# Patient Record
Sex: MALE | Race: WHITE
[De-identification: names, ages, dates, MRNs, and addresses within clinical notes are randomized per-mention and may not be internally consistent; named-entity substitution may affect disease eponyms.]

---

## 2017-12-08 ENCOUNTER — HOSPITAL ENCOUNTER (EMERGENCY)
Dept: HOSPITAL 80 - FED | Age: 82
Discharge: HOME | End: 2017-12-08
Payer: COMMERCIAL

## 2017-12-08 VITALS
OXYGEN SATURATION: 93 % | HEART RATE: 84 BPM | DIASTOLIC BLOOD PRESSURE: 82 MMHG | SYSTOLIC BLOOD PRESSURE: 154 MMHG | RESPIRATION RATE: 16 BRPM

## 2017-12-08 VITALS — TEMPERATURE: 97.9 F

## 2017-12-08 DIAGNOSIS — Z79.01: ICD-10-CM
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
## 2017-12-08 DIAGNOSIS — I10: ICD-10-CM
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
## 2017-12-08 DIAGNOSIS — S22.080A: Primary | ICD-10-CM

## 2017-12-08 LAB
% IMMATURE GRANULYOCYTES: 0.2 % (ref 0–1.1)
ABSOLUTE IMMATURE GRANULOCYTES: 0.02 10^3/UL (ref 0–0.1)
ABSOLUTE NRBC COUNT: 0 10^3/UL (ref 0–0.01)
ADD DIFF?: NO
ADD MORPH?: NO
ADD SCAN?: NO
ANION GAP SERPL CALC-SCNC: 15 MEQ/L (ref 8–16)
APTT BLD: 28.3 SEC (ref 23–38)
ATYPICAL LYMPHOCYTE FLAG: 0 (ref 0–99)
CALCIUM SERPL-MCNC: 10.2 MG/DL (ref 8.5–10.4)
CHLORIDE SERPL-SCNC: 105 MEQ/L (ref 97–110)
CO2 SERPL-SCNC: 22 MEQ/L (ref 22–31)
CREAT SERPL-MCNC: 1.8 MG/DL (ref 0.7–1.3)
ERYTHROCYTE [DISTWIDTH] IN BLOOD BY AUTOMATED COUNT: 13.2 % (ref 11.5–15.2)
FRAGMENT RBC FLAG: 0 (ref 0–99)
GFR SERPL CREATININE-BSD FRML MDRD: 36 ML/MIN/{1.73_M2}
GLUCOSE SERPL-MCNC: 105 MG/DL (ref 70–100)
HCT VFR BLD CALC: 43.6 % (ref 40–51)
HGB BLD-MCNC: 15.4 G/DL (ref 13.7–17.5)
INR PPP: 1.22 (ref 0.83–1.16)
LEFT SHIFT FLG: 0 (ref 0–99)
LIPEMIA HEMOLYSIS FLAG: 90 (ref 0–99)
MCH RBC BLDCO QN: 33.2 PG (ref 27.9–34.1)
MCHC RBC AUTO-ENTMCNC: 35.3 G/DL (ref 32.4–36.7)
MCV RBC AUTO: 94 FL (ref 81.5–99.8)
NRBC-AUTO%: 0 % (ref 0–0.2)
PLATELET # BLD: 123 10^3/UL (ref 150–400)
PLATELET CLUMPS FLAG: 10 (ref 0–99)
PMV BLD AUTO: 9.1 FL (ref 8.7–11.7)
POTASSIUM SERPL-SCNC: 4.5 MEQ/L (ref 3.5–5.2)
PROTHROMBIN TIME: 15.6 SEC (ref 12–15)
RBC # BLD AUTO: 4.64 10^6/UL (ref 4.4–6.38)
SODIUM SERPL-SCNC: 142 MEQ/L (ref 134–144)

## 2017-12-08 NOTE — EDPHY
General


Narrative: 





CHIEF COMPLAINT: 


Fall, back pain





HISTORY OF PRESENT ILLNESS: 


Patient presents with son at bedside.  He complains of fall down the stairs 

yesterday.  He was walking up stairs from his basement when he slipped and 

fell.  He fell backwards, landing on his low back and striking his head.  He 

thinks that he slid down approximately 5-7 stairs.  He did not lose 

consciousness.  He has no headache or neck pain.  No changes in vision.  No 

nausea or vomiting. No chest pain or shortness of breath.  No abdominal pain.  

His only complaint is back pain. This is located in the right lower lumbar soft 

tissue just over the posterior superior iliac spine.  He has no midline 

tenderness at any level.  No saddle anesthesia.  No weakness of the lower 

extremities.  He is able to ambulate but twisting and transferring from 1 spot 

to another is moderately painful.  He has no other associated complaints or 

modifying factors.  He does take Coumadin.





REVIEW OF SYSTEMS:


Ten systems reviewed and are negative unless otherwise noted in the HPI





PCP:


Dr. Alka Garcia





SPECIALISTS:


None





PAST MEDICAL HISTORY: 


Dyslipidemia, atrial fibrillation, hypertension, taking Coumadin





PAST SURGICAL HISTORY:


None recently





SOCIAL HISTORY:


Nonsmoker.  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





FAMILY HISTORY:


Noncontributory





EXAMINATION


General Appearance:  Alert, no distress


Head: normocephalic, atraumatic.  No depression.  No hematoma.  No laceration.  

No John sign. No raccoon eyes.


Eyes:  Bilateral arcus senilis.  Pupils equal and round, no conjunctival pallor 

or injection.  EOMs intact.


ENT, Mouth:  Mucous membranes moist.  Airway is patent


Neck:  Supple nontender. No crepitus, step-off or deformity.  Mild reversal of 

lordosis.


Respiratory:  Lungs are clear to auscultation.  No wheezing, rhonchi or crackles


Cardiovascular:  Regular rate and rhythm.  Grade 1 systolic murmur.


Gastrointestinal:  Abdomen is soft and nontender.  No tympany.  No rigidity.  

No CVA tenderness.


Back:  No midline tenderness. No crepitus, step-off or deformity.  There is 

tenderness of the soft tissue of the right lumbar region just over the 

posterior superior iliac spine.


Neurological:  GCS 15.  Cranial nerves 2-12 grossly intact.  A&O, nonfocal, 

antalgic but steady gait.  Strength is symmetric in all 4 limbs.  Symmetric 

patellar reflexes 2+.


Skin:  Warm and dry, no rash.  Multiple H spots.  No lacerations or contusions.


Extremities:  Nontender, no pedal edema.  Symmetric range of motion.


Psychiatric:  Mood and affect normal





DIFFERENTIAL DIAGNOSES:


Including but not limited to sprain, strain, fracture, intracranial hemorrhage, 

cervical fracture, contusion, hematoma, renal laceration








MDM:


10:23 a.m.


Mechanical fall yesterday with right-sided soft tissue back pain over the 

lumbar region.  He has no midline tenderness at any level of the spine.  No 

headache.  He does take Coumadin, thus I have ordered CT scans of the head, 

cervical spine and abdomen pelvis.  He is in no acute distress. Normal neuro 

examination.





11:30 a.m.


Case discussed with radiologist Dr. Oppenheimer.  CT of the head negative for 

any acute findings.  CT cervical spine reveals a fracture at T1 of uncertain 

chronicity.  Recommends lateral flexion extension to rule out unstable 

fracture.  There is an acute T12 fracture with retropulsion but the posterior 

elements are intact. I will consult Neurosurgery due to the T12 fracture.  The 

T1 fractures likely chronic as he has no pain there, but I will obtain the 

flexion extension imaging.





11:35 a.m.


Case discussed with neurosurgeon Dr. Martinez.  He will review the CT scan return 

my call.  His recommendation is a [place] brace and follow up in the clinic.





11:40 a.m.


I re-evaluated the patient.  He confirmed that there is no tenderness palpation 

in the region of the T1 fracture.  This most likely is an old injury. He does 

have some occasional pain in that area when he writes for prolonged.  Looking 

at the computer screen.  Confirm that there are no radicular symptoms of the 

arms.  He also has no saddle anesthesia, incontinence, weakness of lower 

extremities. I have ordered the brace to be placed.  I have also ordered the 

flex and extension x-ray.  I have also ordered laboratory studies as he is on 

Coumadin.  He is in no acute distress.  He is neuro intact at this time.





12:45 p.m.


Flexion-extension x-ray is negative for any unstable fracture





1:30 p.m.


Patient re-evaluated.  He has the [place] brace in place.  He has ambulated 

multiple times without difficulty.  I did offer admission the hospital for re-

evaluation and consideration of kyphoplasty after reversal of Coumadin, but he 

declines.  He would like to go home.  I do feel he is stable for discharge 

home.  I would like him to return to the ER should she have worsening pain, any 

saddle anesthesia, weakness of the lower extremities, incontinence of bowel or 

bladder.  He is comfortable this plan and asking to be discharged home.





SUPERVISION:


Patient was independently examined, but I discussed the case with my secondary 

supervising physician Dr. Mccollester








- Diagnostics


Imaging Results: 


 Imaging Impressions





Abdomen/Pelvis CT  12/08/17 10:22


Impression: No acute posttraumatic abnormality identified. The patient has had 

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


 


2. CT Cervical Spine Without Contrast at 10:41 AM


 


History: Trauma. Fall yesterday. Pain. on Coumadin.


 


Technique: Multislice helical CT through the cervical spine without contrast 

from the skull base to T1. Soft tissue and bone evaluation is performed. 

Sagittal and coronal reconstructions are obtained and reviewed. Dose reduction 

techniques were utilized.


 


Findings: There is a mild superior endplate compression of T1, that is of 

unknown age. Cervical alignment is anatomic, but straight. There are 

degenerative retrolisthesis cc between C5 and C7 and a mild degenerative 

spondylolisthesis at C7-T1. There is degenerative disk space narrowing at C3-C4 

and between C5 and C7. No subluxation or dislocation is identified. The 

relationship between skull base and C1 is normal. The C1-C2 articulation is 

normally aligned, but severely osteoarthritic. The odontoid process is intact.  

Facet joints are normally aligned, but significantly degenerated on the right 

it see 2-C3 and between C6 and T1 and on the left between C3 and C3 and C5 and 

at C7-T1. The left C2-C3 facet joint and disk space are solidly fused..  Soft 

tissue window evaluation does not show evidence of epidural or prevertebral 

hematoma. Large posterior osteophytes at C6-C7 cause moderate encroachment on 

the ventral thecal sac, right greater than left. Incidentally noted is 

bilateral carotid bifurcation atherosclerotic calcification and severe left TMJ 

arthritic change.


 


Impression: 1. Mild T1 compression of unknown age, associated with mild 

spondylolisthesis at C7-T1. The patient is not a candidate for MRI since he has 

a pacemaker. 


2. Multilevel degenerative disk and facet change described above.


3. No acute cervical fracture identified.


4. If there is concern for instability consider lateral flexion-extension views.


 


3. CT Abdomen and Pelvis Without Contrast ([facility] Protocol), 10:50 AM


 


History: Fall yesterday, right back pain and buttock pain, patient on Coumadin 

and  history of left nephrectomy


 


Technique: Ultrathin ultrafast 128 slice helical CT through the abdomen and 

pelvis without contrast. Dose reduction techniques were utilized.


 


Findings: There is an acute appearing mild compression abnormality of T12 

involving both the superior and inferior endplates. There is mild, 3-4 mm, 

retropulsion of the posterior aspect of T12 into the ventral aspect of the 

neural canal. A dominant fracture line courses obliquely through the anterior 

inferior portion of the vertebral body. Interestingly, there are 2 small dural 

or extradural calcifications in the anterior right epidural space behind the 

lower T12 level, that worse causes a mild right anterior ventral extradural 

defect on the thecal sac and causes mild encroachment on the ventral aspect of 

the medial right T12-L1 foramen. No other spinal fracture is identified. There 

is degenerative lumbar disk disease between L2 and L3 and between L4 and S1. 

Lumbar alignment is anatomic. The T12 pedicles are not widened and the 

posterior elements are intact. The facet joints remain normally aligned. There 

is no significant paraspinal hematoma. Is a small amount of edema in the fat 

adjacent to the left side of T12.


 


The lung bases are normally aerated without pulmonary contusion, pleural fluid 

or basilar pneumothorax. There is a small lung cyst at the posterior right lung 

base as well as a cluster of calcified granulomas. Pacer wires overlie the 

normal sized heart. There is no pericardial effusion. There is calcification of 

the aortic valve leaflets and evidence of coronary artery atherosclerotic 

disease.


 


There is no evidence of hemorrhage in the peritoneal cavity or retroperitoneum. 

The patient has a normal-appearing right hip replacement. There is 

atherosclerotic calcification of a normal sized abdominal aorta. The right 

kidney is associated with a 19 mm lower pole low density lesion that has 

average Hounsfield units of 2 and likely represents an incidental cyst. There 

is no mass or nodularity in the left renal bed or associated retroperitoneal 

adenopathy. There are calcified granulomas in the spleen and liver. There is no 

free fluid or evidence for bowel obstruction. No pelvic fracture is identified 

the left hip and pelvic ring appear intact. The SI joints are normally located. 

No lumbar fracture is identified.


 


Impression: 1. Acute mild T12 compression fracture.


2. No intra-abdominal or soft tissue hematoma identified.


 


Results of all studies called and discussed with Cra Be, at 12/8/2017 11:

26


 


Attention: This CT examination is specifically designed to evaluate patients 

who are clinically suspected of having acute obstructive uropathy.  This 

examination does not use radiographic contrast, and as such, provides only a  

limited evaluation of the abdomen, pelvis and retroperitoneum.  If there is 

further clinical suspicion for pathological conditions other than obstructive 

uropathy, a complete CT evaluation of the abdomen and pelvis utilizing 

intravenous, oral, and rectal contrast should be considered.


 


General information for patients regarding this examination can be found at 

[email or web address].


 


If you have questions or comments about this report, please contact me at [phone] (hospital) or [phone] (cell). 


 








Cervical Spine CT  12/08/17 10:22


Impression: No acute posttraumatic abnormality identified. The patient has had 

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


 


2. CT Cervical Spine Without Contrast at 10:41 AM


 


History: Trauma. Fall yesterday. Pain. on Coumadin.


 


Technique: Multislice helical CT through the cervical spine without contrast 

from the skull base to T1. Soft tissue and bone evaluation is performed. 

Sagittal and coronal reconstructions are obtained and reviewed. Dose reduction 

techniques were utilized.


 


Findings: There is a mild superior endplate compression of T1, that is of 

unknown age. Cervical alignment is anatomic, but straight. There are 

degenerative retrolisthesis cc between C5 and C7 and a mild degenerative 

spondylolisthesis at C7-T1. There is degenerative disk space narrowing at C3-C4 

and between C5 and C7. No subluxation or dislocation is identified. The 

relationship between skull base and C1 is normal. The C1-C2 articulation is 

normally aligned, but severely osteoarthritic. The odontoid process is intact.  

Facet joints are normally aligned, but significantly degenerated on the right 

it see 2-C3 and between C6 and T1 and on the left between C3 and C3 and C5 and 

at C7-T1. The left C2-C3 facet joint and disk space are solidly fused..  Soft 

tissue window evaluation does not show evidence of epidural or prevertebral 

hematoma. Large posterior osteophytes at C6-C7 cause moderate encroachment on 

the ventral thecal sac, right greater than left. Incidentally noted is 

bilateral carotid bifurcation atherosclerotic calcification and severe left TMJ 

arthritic change.


 


Impression: 1. Mild T1 compression of unknown age, associated with mild 

spondylolisthesis at C7-T1. The patient is not a candidate for MRI since he has 

a pacemaker. 


2. Multilevel degenerative disk and facet change described above.


3. No acute cervical fracture identified.


4. If there is concern for instability consider lateral flexion-extension views.


 


3. CT Abdomen and Pelvis Without Contrast ([facility] Protocol), 10:50 AM


 


History: Fall yesterday, right back pain and buttock pain, patient on Coumadin 

and  history of left nephrectomy


 


Technique: Ultrathin ultrafast 128 slice helical CT through the abdomen and 

pelvis without contrast. Dose reduction techniques were utilized.


 


Findings: There is an acute appearing mild compression abnormality of T12 

involving both the superior and inferior endplates. There is mild, 3-4 mm, 

retropulsion of the posterior aspect of T12 into the ventral aspect of the 

neural canal. A dominant fracture line courses obliquely through the anterior 

inferior portion of the vertebral body. Interestingly, there are 2 small dural 

or extradural calcifications in the anterior right epidural space behind the 

lower T12 level, that worse causes a mild right anterior ventral extradural 

defect on the thecal sac and causes mild encroachment on the ventral aspect of 

the medial right T12-L1 foramen. No other spinal fracture is identified. There 

is degenerative lumbar disk disease between L2 and L3 and between L4 and S1. 

Lumbar alignment is anatomic. The T12 pedicles are not widened and the 

posterior elements are intact. The facet joints remain normally aligned. There 

is no significant paraspinal hematoma. Is a small amount of edema in the fat 

adjacent to the left side of T12.


 


The lung bases are normally aerated without pulmonary contusion, pleural fluid 

or basilar pneumothorax. There is a small lung cyst at the posterior right lung 

base as well as a cluster of calcified granulomas. Pacer wires overlie the 

normal sized heart. There is no pericardial effusion. There is calcification of 

the aortic valve leaflets and evidence of coronary artery atherosclerotic 

disease.


 


There is no evidence of hemorrhage in the peritoneal cavity or retroperitoneum. 

The patient has a normal-appearing right hip replacement. There is 

atherosclerotic calcification of a normal sized abdominal aorta. The right 

kidney is associated with a 19 mm lower pole low density lesion that has 

average Hounsfield units of 2 and likely represents an incidental cyst. There 

is no mass or nodularity in the left renal bed or associated retroperitoneal 

adenopathy. There are calcified granulomas in the spleen and liver. There is no 

free fluid or evidence for bowel obstruction. No pelvic fracture is identified 

the left hip and pelvic ring appear intact. The SI joints are normally located. 

No lumbar fracture is identified.


 


Impression: 1. Acute mild T12 compression fracture.


2. No intra-abdominal or soft tissue hematoma identified.


 


Results of all studies called and discussed with Car Be, at 12/8/2017 11:

26


 


Attention: This CT examination is specifically designed to evaluate patients 

who are clinically suspected of having acute obstructive uropathy.  This 

examination does not use radiographic contrast, and as such, provides only a  

limited evaluation of the abdomen, pelvis and retroperitoneum.  If there is 

further clinical suspicion for pathological conditions other than obstructive 

uropathy, a complete CT evaluation of the abdomen and pelvis utilizing 

intravenous, oral, and rectal contrast should be considered.


 


General information for patients regarding this examination can be found at 

[email or web address].


 


If you have questions or comments about this report, please contact me at [phone] (hospital) or [phone] (cell). 


 








Head CT  12/08/17 10:22


Impression: No acute posttraumatic abnormality identified. The patient has had 

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


 


2. CT Cervical Spine Without Contrast at 10:41 AM


 


History: Trauma. Fall yesterday. Pain. on Coumadin.


 


Technique: Multislice helical CT through the cervical spine without contrast 

from the skull base to T1. Soft tissue and bone evaluation is performed. 

Sagittal and coronal reconstructions are obtained and reviewed. Dose reduction 

techniques were utilized.


 


Findings: There is a mild superior endplate compression of T1, that is of 

unknown age. Cervical alignment is anatomic, but straight. There are 

degenerative retrolisthesis cc between C5 and C7 and a mild degenerative 

spondylolisthesis at C7-T1. There is degenerative disk space narrowing at C3-C4 

and between C5 and C7. No subluxation or dislocation is identified. The 

relationship between skull base and C1 is normal. The C1-C2 articulation is 

normally aligned, but severely osteoarthritic. The odontoid process is intact.  

Facet joints are normally aligned, but significantly degenerated on the right 

it see 2-C3 and between C6 and T1 and on the left between C3 and C3 and C5 and 

at C7-T1. The left C2-C3 facet joint and disk space are solidly fused..  Soft 

tissue window evaluation does not show evidence of epidural or prevertebral 

hematoma. Large posterior osteophytes at C6-C7 cause moderate encroachment on 

the ventral thecal sac, right greater than left. Incidentally noted is 

bilateral carotid bifurcation atherosclerotic calcification and severe left TMJ 

arthritic change.


 


Impression: 1. Mild T1 compression of unknown age, associated with mild 

spondylolisthesis at C7-T1. The patient is not a candidate for MRI since he has 

a pacemaker. 


2. Multilevel degenerative disk and facet change described above.


3. No acute cervical fracture identified.


4. If there is concern for instability consider lateral flexion-extension views.


 


3. CT Abdomen and Pelvis Without Contrast ([facility]), 10:50 AM


 


History: Fall yesterday, right back pain and buttock pain, patient on Coumadin 

and  history of left nephrectomy


 


Technique: Ultrathin ultrafast 128 slice helical CT through the abdomen and 

pelvis without contrast. Dose reduction techniques were utilized.


 


Findings: There is an acute appearing mild compression abnormality of T12 

involving both the superior and inferior endplates. There is mild, 3-4 mm, 

retropulsion of the posterior aspect of T12 into the ventral aspect of the 

neural canal. A dominant fracture line courses obliquely through the anterior 

inferior portion of the vertebral body. Interestingly, there are 2 small dural 

or extradural calcifications in the anterior right epidural space behind the 

lower T12 level, that worse causes a mild right anterior ventral extradural 

defect on the thecal sac and causes mild encroachment on the ventral aspect of 

the medial right T12-L1 foramen. No other spinal fracture is identified. There 

is degenerative lumbar disk disease between L2 and L3 and between L4 and S1. 

Lumbar alignment is anatomic. The T12 pedicles are not widened and the 

posterior elements are intact. The facet joints remain normally aligned. There 

is no significant paraspinal hematoma. Is a small amount of edema in the fat 

adjacent to the left side of T12.


 


The lung bases are normally aerated without pulmonary contusion, pleural fluid 

or basilar pneumothorax. There is a small lung cyst at the posterior right lung 

base as well as a cluster of calcified granulomas. Pacer wires overlie the 

normal sized heart. There is no pericardial effusion. There is calcification of 

the aortic valve leaflets and evidence of coronary artery atherosclerotic 

disease.


 


There is no evidence of hemorrhage in the peritoneal cavity or retroperitoneum. 

The patient has a normal-appearing right hip replacement. There is 

atherosclerotic calcification of a normal sized abdominal aorta. The right 

kidney is associated with a 19 mm lower pole low density lesion that has 

average Hounsfield units of 2 and likely represents an incidental cyst. There 

is no mass or nodularity in the left renal bed or associated retroperitoneal 

adenopathy. There are calcified granulomas in the spleen and liver. There is no 

free fluid or evidence for bowel obstruction. No pelvic fracture is identified 

the left hip and pelvic ring appear intact. The SI joints are normally located. 

No lumbar fracture is identified.


 


Impression: 1. Acute mild T12 compression fracture.


2. No intra-abdominal or soft tissue hematoma identified.


 


Results of all studies called and discussed with Car Be, at 12/8/2017 11:

26


 


Attention: This CT examination is specifically designed to evaluate patients 

who are clinically suspected of having acute obstructive uropathy.  This 

examination does not use radiographic contrast, and as such, provides only a  

limited evaluation of the abdomen, pelvis and retroperitoneum.  If there is 

further clinical suspicion for pathological conditions other than obstructive 

uropathy, a complete CT evaluation of the abdomen and pelvis utilizing 

intravenous, oral, and rectal contrast should be considered.


 


General information for patients regarding this examination can be found at 

[email or web address]


 


If you have questions or comments about this report, please contact me at [phone] (hospital) or [phone] (cell). 


 








Cervical Spine X-Ray  12/08/17 11:28


Impression: No instability identified in this patient with limited range of 

motion


 














- History


Smoking Status: Never smoked





- Objective


Vital Signs: 


 Initial Vital Signs











Temperature (C)  97.9 F   12/08/17 09:41


 


Heart Rate  60   12/08/17 09:41


 


Respiratory Rate  17   12/08/17 09:41


 


Blood Pressure  118/72   12/08/17 09:41


 


O2 Sat (%)  91 L  12/08/17 09:41








 











O2 Delivery Mode               Room Air














Allergies/Adverse Reactions: 


 





No Allergies [NKDA] Allergy (Verified 07/17/14 10:06)


 


SEASONAL Allergy (Intermediate, Uncoded 09/03/11 10:24)


 ITCHY EYES, SNEEZY








Home Medications: 














 Medication  Instructions  Recorded


 


Cholecalciferol (Vitamin D3) 1,000 unit PO BID 09/03/11





[Vitamin D3]  


 


Metoprolol Succinate Xr [Toprol Xl 50 mg PO BID 09/03/11





50 mg (*)]  


 


Warfarin Sodium [Coumadin 5MG (*)] 2.5 mg PO SUTUTHSA 09/03/11


 


Warfarin Sodium [Coumadin 5MG (*)] 5 mg PO MOWEFR 09/03/11


 


Acetaminophen [Pain Relief] 650 mg PO DAILY PRN 01/27/12


 


Atorvastatin Calcium [Lipitor 80 80 mg PO HS 07/17/14





mg]  


 


Hydrocodone/APAP 5/325 [Norco 1 - 2 tab PO Q4H PRN #15 tab 12/08/17





5/325 (*)]  











Laboratory Results: 


 Laboratory Results





 12/08/17 12:07 





 12/08/17 12:07 





 











  12/08/17 12/08/17 12/08/17





  12:07 12:07 12:07


 


WBC      8.57 10^3/uL 10^3/uL





     (3.80-9.50) 


 


RBC      4.64 10^6/uL 10^6/uL





     (4.40-6.38) 


 


Hgb      15.4 g/dL g/dL





     (13.7-17.5) 


 


Hct      43.6 % %





     (40.0-51.0) 


 


MCV      94.0 fL fL





     (81.5-99.8) 


 


MCH      33.2 pg pg





     (27.9-34.1) 


 


MCHC      35.3 g/dL g/dL





     (32.4-36.7) 


 


RDW      13.2 % %





     (11.5-15.2) 


 


Plt Count      123 10^3/uL L 10^3/uL





     (150-400) 


 


MPV      9.1 fL fL





     (8.7-11.7) 


 


Neut % (Auto)      81.0 % H %





     (39.3-74.2) 


 


Lymph % (Auto)      9.9 % L %





     (15.0-45.0) 


 


Mono % (Auto)      7.5 % %





     (4.5-13.0) 


 


Eos % (Auto)      1.2 % %





     (0.6-7.6) 


 


Baso % (Auto)      0.2 % L %





     (0.3-1.7) 


 


Nucleat RBC Rel Count      0.0 % %





     (0.0-0.2) 


 


Absolute Neuts (auto)      6.94 10^3/uL H 10^3/uL





     (1.70-6.50) 


 


Absolute Lymphs (auto)      0.85 10^3/uL L 10^3/uL





     (1.00-3.00) 


 


Absolute Monos (auto)      0.64 10^3/uL 10^3/uL





     (0.30-0.80) 


 


Absolute Eos (auto)      0.10 10^3/uL 10^3/uL





     (0.03-0.40) 


 


Absolute Basos (auto)      0.02 10^3/uL 10^3/uL





     (0.02-0.10) 


 


Absolute Nucleated RBC      0.00 10^3/uL 10^3/uL





     (0-0.01) 


 


Immature Gran %      0.2 % %





     (0.0-1.1) 


 


Immature Gran #      0.02 10^3/uL 10^3/uL





     (0.00-0.10) 


 


PT    15.6 SEC H SEC  





    (12.0-15.0)  


 


INR    1.22  H   





    (0.83-1.16)  


 


APTT    28.3 SEC SEC  





    (23.0-38.0)  


 


Sodium  142 mEq/L mEq/L    





   (134-144)   


 


Potassium  4.5 mEq/L mEq/L    





   (3.5-5.2)   


 


Chloride  105 mEq/L mEq/L    





   ()   


 


Carbon Dioxide  22 mEq/l mEq/l    





   (22-31)   


 


Anion Gap  15 mEq/L mEq/L    





   (8-16)   


 


BUN  37 mg/dL H mg/dL    





   (7-23)   


 


Creatinine  1.8 mg/dL H mg/dL    





   (0.7-1.3)   


 


Estimated GFR  36     





    


 


Glucose  105 mg/dL H mg/dL    





   ()   


 


Calcium  10.2 mg/dL mg/dL    





   (8.5-10.4)   














Departure





- Departure


Disposition: Home, Routine, Self-Care


Clinical Impression: 


 T12 compression fracture





Condition: Good


Instructions:  Vertebral Compression Fracture (ED)


Additional Instructions: 


1.  Wear your brace as instructed


2.  Contact her primary care physician


3.  Contact the on-call neurosurgeon as provided


4.  ED precautions for worsening pain, difficulty ambulating, numbness, 

tingling or weakness


Referrals: 


Alka Pickett MD [Primary Care Provider] - As per Instructions


Brenton Martinez MD [Medical Doctor] - As per Instructions


Prescriptions: 


Hydrocodone/APAP 5/325 [Norco 5/325 (*)] 1 - 2 tab PO Q4H PRN #15 tab


 PRN Reason: Pain, Moderate

## 2017-12-26 ENCOUNTER — HOSPITAL ENCOUNTER (OUTPATIENT)
Dept: HOSPITAL 80 - FIMAGING | Age: 82
Discharge: HOME | End: 2017-12-26
Attending: INTERNAL MEDICINE
Payer: COMMERCIAL

## 2017-12-26 VITALS
TEMPERATURE: 97.7 F | RESPIRATION RATE: 16 BRPM | OXYGEN SATURATION: 93 % | SYSTOLIC BLOOD PRESSURE: 120 MMHG | DIASTOLIC BLOOD PRESSURE: 66 MMHG

## 2017-12-26 VITALS — HEART RATE: 61 BPM

## 2017-12-26 DIAGNOSIS — S22.080A: Primary | ICD-10-CM

## 2017-12-26 DIAGNOSIS — I10: ICD-10-CM

## 2017-12-26 DIAGNOSIS — Z96.652: ICD-10-CM

## 2017-12-26 DIAGNOSIS — Z96.641: ICD-10-CM
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
## 2017-12-26 DIAGNOSIS — I48.91: ICD-10-CM

## 2017-12-26 DIAGNOSIS — Z95.0: ICD-10-CM

## 2017-12-26 DIAGNOSIS — Z79.01: ICD-10-CM

## 2017-12-26 DIAGNOSIS — Z90.5: ICD-10-CM

## 2017-12-26 DIAGNOSIS — Z85.528: ICD-10-CM

## 2017-12-26 DIAGNOSIS — M54.9: ICD-10-CM

## 2017-12-26 DIAGNOSIS — G47.30: ICD-10-CM

## 2017-12-26 LAB
% IMMATURE GRANULYOCYTES: 0.3 % (ref 0–1.1)
ABSOLUTE IMMATURE GRANULOCYTES: 0.02 10^3/UL (ref 0–0.1)
ABSOLUTE NRBC COUNT: 0 10^3/UL (ref 0–0.01)
ADD DIFF?: NO
ADD MORPH?: NO
ADD SCAN?: NO
APTT BLD: 24.6 SEC (ref 23–38)
ATYPICAL LYMPHOCYTE FLAG: 0 (ref 0–99)
ERYTHROCYTE [DISTWIDTH] IN BLOOD BY AUTOMATED COUNT: 13.2 % (ref 11.5–15.2)
FRAGMENT RBC FLAG: 0 (ref 0–99)
HCT VFR BLD CALC: 51.2 % (ref 40–51)
HGB BLD-MCNC: 17.7 G/DL (ref 13.7–17.5)
INR PPP: 1.06 (ref 0.83–1.16)
LEFT SHIFT FLG: 0 (ref 0–99)
LIPEMIA HEMOLYSIS FLAG: 90 (ref 0–99)
MCH RBC BLDCO QN: 32.8 PG (ref 27.9–34.1)
MCHC RBC AUTO-ENTMCNC: 34.6 G/DL (ref 32.4–36.7)
MCV RBC AUTO: 94.8 FL (ref 81.5–99.8)
NRBC-AUTO%: 0 % (ref 0–0.2)
PLATELET # BLD: 186 10^3/UL (ref 150–400)
PLATELET CLUMPS FLAG: 10 (ref 0–99)
PMV BLD AUTO: 9.4 FL (ref 8.7–11.7)
PROTHROMBIN TIME: 14 SEC (ref 12–15)
RBC # BLD AUTO: 5.4 10^6/UL (ref 4.4–6.38)

## 2017-12-26 NOTE — PDPROPOC
Sedation Plan of Care


ASA Classification: ASA 2


Planned drugs: other (per anesthesiologist)


Mallampati Score: Class 1


Mallampati Reference Image: 





Patient passed 3-3-2 rule?: Yes

## 2017-12-26 NOTE — PDGENHP
History & Physical


Chief Complaint: Back pain, 10/10


History of Present Illness: Pain after falling December 7, 2017. Pain reaches 10

/10 severity.


Pertinent Past, Social, Family History: CT shows fracture of T12 verterbral body

, probably osteoporotic.  Kidney removed 15 years ago for CA.  No allergies.


Relevant Physical Exam: Good range of motion of neck.  Right handed.


Cardiorespiratory Assessment: Heart: RRR, distant sounds, 60 bpm, no murmur.  

Lungs: clear to auscultation.

## 2017-12-26 NOTE — PDANEPAE
ANE History of Present Illness





88 year old male for kyphoplasty





ANE Past Medical History





- Cardiovascular History


Hx Hypertension: Yes


Hx Arrhythmias: Yes


Hx Chest Pain: No


Hx Coronary Artery / Peripheral Vascular Disease: Yes


Hx CHF / Valvular Disease: No


Hx Palpitations: Yes


Cardiovascular History Comment: Atrial fibrillation at times





- Pulmonary History


Hx COPD: No


Hx Asthma/Reactive Airway Disease: No


Hx Recent Upper Respiratory Infection: No


Hx Oxygen in Use at Home: Yes


Hx Sleep Apnea: Yes


Sleep Apnea Screening Result - Last Documented: Positive





- Neurologic History


Hx Cerebrovascular Accident: No


Hx Seizures: No


Hx Dementia: No





- Endocrine History


Hx Diabetes: No





- Renal History


Hx Renal Disorders: Yes


Renal History Comment: Left nephrectomy





- Liver History


Hx Hepatic Disorders: No





- Neurological & Psychiatric Hx


Hx Neurological and Psychiatric Disorders: No





- Cancer History


Hx Cancer: Yes


Cancer History Comment: Left kidney cancer





- Congenital Disorder History


Hx Congenital Disorders: No





- GI History


Hx Gastrointestinal Disorders: No





- Chronic Pain History


Chronic Pain: No (back pain)





- Surgical History


Prior Surgeries: Left knee replacement 2012.  Right hip replacement 2011.  Left 

nephrectomy 2002.  Hernia repair 1990





ANE Review of Systems


Review of Systems: 








- Exercise capacity


METS (RN): 3 METS





- Pacemaker


Pacemaker Type: Transvenous


Pacemaker : EnCoate


Date Pacemaker Last Checked: 08/11/11





ANE Patient History





- Allergies


Allergies/Adverse Reactions: 








No Allergies [NKDA] Allergy (Verified 07/17/14 10:06)


 


SEASONAL Allergy (Intermediate, Uncoded 09/03/11 10:24)


 ITCHY EYES, SNEEZY








- Home Medications


Home Medications: 








Cholecalciferol (Vitamin D3) [Vitamin D3] 1,000 unit PO BID 09/03/11 [Last 

Taken 07/16/14]


Metoprolol Succinate Xr [Toprol Xl 50 mg (*)] 100 mg PO DAILY 09/03/11 [Last 

Taken 07/16/14]


Warfarin Sodium [Coumadin 5MG (*)] 2.5 mg PO SUTUTHSA 09/03/11 [Last Taken 12/19 /17]


Warfarin Sodium [Coumadin 5MG (*)] 5 mg PO MOWEFR 09/03/11 [Last Taken 12/19/17]


Acetaminophen [Pain Relief] 650 mg PO DAILY PRN 01/27/12 [Last Taken Unknown]


Atorvastatin Calcium [Lipitor 80 mg] 80 mg PO HS 07/17/14 [Last Taken 07/16/14]








- Smoking Hx


Smoking Status: Never smoked





- Family Anes Hx


Family Hx Anesthesia Complications: None





ANE Labs/Vital Signs





- Labs


Result Diagrams: 


 12/26/17 09:20








- Vital Signs


Blood Pressure: 105/74


Heart Rate: 60


Respiratory Rate: 20


O2 Sat (%): 91


Height: 175.26 cm


Weight: 74.843 kg





ANE Physical Exam





- Airway


Mallampati Score: Class 2





- ASA Status


ASA Status: III

## 2017-12-26 NOTE — PDRADPN
Radiology Procedure Note


Date of Procedure: 12/26/17


Radiologist: Govind Lopez


Anesthesiologist: Dr. Xavier Rodriguez


Anesthesia: GET(General Endotracheal)


Pre-op Diagnosis: Compression fracture of T12


Post-op Diagnosis: Same


Indication: Debilitating pain, not helped by brace


Procedure: Core needle biopsy and Kyphoplasty of T12. 


Finding(s): 5 ml cement.  Distribution OK.


Inf/Abcess present in the surg proc area at time of surgery?: No


EBL: Minimal


Complications: 


0


Specimen(s): 


14 ga. core in formalin.  Sent for histology.

## 2017-12-26 NOTE — POSTANESTH
Post Anesthetic Evaluation


Respiratory Status: Normal, Stable


Level of Consciousness/Mental Status: Can Participate in Eval


Pain Control: Adequate, Prn Tx Ordered


Nausea/Vomiting Control: Adequate, Prn Tx Ordered

## 2018-04-20 ENCOUNTER — HOSPITAL ENCOUNTER (EMERGENCY)
Dept: HOSPITAL 80 - FED | Age: 83
Discharge: HOME | End: 2018-04-20
Payer: COMMERCIAL

## 2018-04-20 VITALS — DIASTOLIC BLOOD PRESSURE: 83 MMHG | SYSTOLIC BLOOD PRESSURE: 150 MMHG

## 2018-04-20 VITALS — DIASTOLIC BLOOD PRESSURE: 74 MMHG | SYSTOLIC BLOOD PRESSURE: 127 MMHG

## 2018-04-20 DIAGNOSIS — R33.9: Primary | ICD-10-CM

## 2018-04-20 DIAGNOSIS — I10: ICD-10-CM
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
## 2018-04-20 DIAGNOSIS — T83.038A: Primary | ICD-10-CM

## 2018-04-20 DIAGNOSIS — Z79.01: ICD-10-CM

## 2018-04-20 PROCEDURE — 0T9B70Z DRAINAGE OF BLADDER WITH DRAINAGE DEVICE, VIA NATURAL OR ARTIFICIAL OPENING: ICD-10-PCS

## 2018-04-20 NOTE — EDPHY
H & P


Stated Complaint: eplaez placed today for urinary retention/leaking


Time Seen by Provider: 04/20/18 18:45


HPI/ROS: 





Chief Complaint:  Pelaez catheter leaking





HPI:  The patient presents the ED with leaking from his Pelaez catheter leg bag.

  He was seen in the ED earlier today and diagnosed with urinary retention.  

The patient denies any abdominal pain but did notice urine collecting on his 

pant leg prior to arrival.  He has no complaints of vomiting fever or 

difficulty breathing.





REVIEW OF SYSTEMS:


Neuro:  no headache, numbness, weakness


Genitourinary:  As above


Musculoskeletal:  as above


Skin:  no abrasion or lacerations








Source: Patient


Exam Limitations: No limitations





- Personal History


Current Tetanus/Diphtheria Vaccine: Yes


Tetanus Vaccine Date: 2011





- Medical/Surgical History


Hx Asthma: No


Hx Chronic Respiratory Disease: No


Hx Diabetes: No


Hx Cardiac Disease: Yes


Hx Renal Disease: No


Hx Cirrhosis: No


Hx Alcoholism: No


Hx HIV/AIDS: No


Hx Splenectomy or Spleen Trauma: No


Other PMH: HX: HERNIAS, TONSILLECTOMY, CA, APPY, AFIB, PACER, HTN, 

HYPERLIPIDEMIA





- Social History


Smoking Status: Never smoked





- Physical Exam


Exam: 





General Appearance:  Alert, no distress


Gastrointestinal:  Abdomen is soft and nontender, no masses, bowel sounds normal


Genitourinary:  The patient's Pelaez catheter was not connected to the leg bag.


Extremities:  symmetrical, full range of motion








Constitutional: 





 Initial Vital Signs











Temperature (C)  36.4 C   04/20/18 18:40


 


Heart Rate  68   04/20/18 18:40


 


Respiratory Rate  17   04/20/18 18:40


 


Blood Pressure  150/83 H  04/20/18 18:40


 


O2 Sat (%)  94   04/20/18 18:40








 











O2 Delivery Mode               Room Air














Allergies/Adverse Reactions: 


 





No Allergies [NKDA] Allergy (Verified 04/20/18 18:39)


 








Home Medications: 














 Medication  Instructions  Recorded


 


Cholecalciferol (Vitamin D3) 1,000 unit PO BID 09/03/11





[Vitamin D3]  


 


Metoprolol Succinate Xr [Toprol Xl 100 mg PO DAILY 09/03/11





50 mg (*)]  


 


Warfarin Sodium [Coumadin 5MG (*)] 2.5 mg PO SUTUTHSA 09/03/11


 


Warfarin Sodium [Coumadin 5MG (*)] 5 mg PO MOWEFR 09/03/11


 


Acetaminophen [Pain Relief] 650 mg PO DAILY PRN 01/27/12


 


Atorvastatin Calcium [Lipitor 80 80 mg PO HS 07/17/14





mg]  


 


Amiodarone HCl  04/20/18


 


Apotex  04/20/18


 


Levothyroxine  04/20/18














Medical Decision Making


ED Course/Re-evaluation: 





Patient presents to the ED from urine leakage secondary to a poorly connected 

leg bag to the Pelaez catheter.  The patient has been advised how to empty the 

Pelaez leg bag.  There is no evidence of recurrent urinary retention.  The 

patient will be discharged emergency department.





Departure





- Departure


Disposition: Home, Routine, Self-Care


Clinical Impression: 


 Malfunction of Pelaez catheter





Condition: Good


Instructions:  Pelaez Catheter Placement and Care (ED)


Additional Instructions: 


1. Return to the ED for any fever, flank pain, vomiting or other concerns.


2. Please follow up as recommended during your earlier ED visit.  


Referrals: 


Alka Pickett MD [Primary Care Provider] - As per Instructions

## 2018-04-20 NOTE — EDPHY
H & P


Stated Complaint: urinary retention


Time Seen by Provider: 04/20/18 08:33


HPI/ROS: 





CHIEF COMPLAINT:  Unable to urinate





HISTORY OF PRESENT ILLNESS:  89-year-old male presents with urinary retention.  

Difficulty urinating for the past 2 days and now unable to urinate since last 

evening.  Associated with lower abdominal discomfort and difficulty having a 

bowel movement.  Last bowel movement was yesterday and relatively normal.  No 

nausea, vomiting or fever.  No prior history of urinary retention.





REVIEW OF SYSTEMS:  complete 10 point ROS negative except at noted in the HPI








- Personal History


Tetanus Vaccine Date: 2011





- Medical/Surgical History


Hx Asthma: No


Hx Chronic Respiratory Disease: No


Hx Diabetes: No


Hx Cardiac Disease: Yes


Hx Renal Disease: No


Hx Cirrhosis: No


Hx Alcoholism: No


Hx HIV/AIDS: No


Hx Splenectomy or Spleen Trauma: No


Other PMH: HX: HERNIAS, TONSILLECTOMY, CA, APPY, AFIB, PACER, HTN, 

HYPERLIPIDEMIA





- Social History


Smoking Status: Never smoked





- Physical Exam


Exam: 





General Appearance:  Alert, pleasant


Eyes:  Pupils equal and round, no conjunctival pallor


ENT, Mouth:  Mucous membranes moist


Neck:  Normal inspection


Respiratory:  Lungs are clear to auscultation


Cardiovascular:  Regular rate and rhythm


Gastrointestinal:  Abdomen is soft, suprapubic distention and tenderness


Neurological:  A&O, nonfocal, normal gait


Skin:  Warm and dry


Extremities:  Normal inspection


Psychiatric:  Mood and affect normal





Constitutional: 


 Initial Vital Signs











Temperature (C)  36.7 C   04/20/18 08:09


 


Heart Rate  60   04/20/18 08:09


 


Respiratory Rate  17   04/20/18 08:09


 


Blood Pressure  160/87 H  04/20/18 08:09


 


O2 Sat (%)  97   04/20/18 08:09








 











O2 Delivery Mode               Room Air














Allergies/Adverse Reactions: 


 





No Allergies [NKDA] Allergy (Verified 04/20/18 18:39)


 








Home Medications: 














 Medication  Instructions  Recorded


 


Cholecalciferol (Vitamin D3) 1,000 unit PO BID 09/03/11





[Vitamin D3]  


 


Metoprolol Succinate Xr [Toprol Xl 100 mg PO DAILY 09/03/11





50 mg (*)]  


 


Warfarin Sodium [Coumadin 5MG (*)] 2.5 mg PO SUTUTHSA 09/03/11


 


Warfarin Sodium [Coumadin 5MG (*)] 5 mg PO MOWEFR 09/03/11


 


Acetaminophen [Pain Relief] 650 mg PO DAILY PRN 01/27/12


 


Atorvastatin Calcium [Lipitor 80 80 mg PO HS 07/17/14





mg]  


 


Amiodarone HCl  04/20/18


 


Apotex  04/20/18


 


Levothyroxine  04/20/18














Medical Decision Making


ED Course/Re-evaluation: 





Bladder scan:  600 mL of urine present.  A Arita catheter was placed.  The 

patient felt much better after Arita catheter placement.  Urinalysis reveals no 

evidence of urinary tract infection.  He was concerned about not having a bowel 

movement.  This is most likely secondary to bladder distention.  A Fleet's 

enema was given.  He will follow up in 2-3 days for Arita catheter removal.


Differential Diagnosis: 





Includes though is not limited to urinary tract infection, cauda equina syndrome

, kidney stone





Departure





- Departure


Disposition: Home, Routine, Self-Care


Clinical Impression: 


 Urinary retention





Condition: Good


Instructions:  Urinary Retention in Men (ED), Arita Catheter Placement and Care 

(ED)


Referrals: 


Alka Pickett MD [Primary Care Provider] - As per Instructions (Follow-up on 

Monday for catheter removal.)